# Patient Record
Sex: FEMALE | Race: WHITE | URBAN - METROPOLITAN AREA
[De-identification: names, ages, dates, MRNs, and addresses within clinical notes are randomized per-mention and may not be internally consistent; named-entity substitution may affect disease eponyms.]

---

## 2020-04-16 ENCOUNTER — HOSPITAL ENCOUNTER (OUTPATIENT)
Dept: MEDSURG UNIT | Facility: HOSPITAL | Age: 80
End: 2020-04-19
Attending: INTERNAL MEDICINE | Admitting: INTERNAL MEDICINE

## 2020-04-19 LAB — FINAL CULTURE: NO GROWTH

## 2022-04-09 ENCOUNTER — HISTORICAL (OUTPATIENT)
Dept: ADMINISTRATIVE | Facility: HOSPITAL | Age: 82
End: 2022-04-09

## 2022-04-26 VITALS
SYSTOLIC BLOOD PRESSURE: 180 MMHG | BODY MASS INDEX: 22.66 KG/M2 | WEIGHT: 136 LBS | DIASTOLIC BLOOD PRESSURE: 88 MMHG | HEIGHT: 65 IN

## 2022-04-30 NOTE — OP NOTE
PREOPERATIVE DIAGNOSES:    1. Gastrointestinal bleeding.  2. Epigastric pain and nausea.    POSTOPERATIVE DIAGNOSES:    1. Gastrointestinal bleeding.  2. Epigastric pain and nausea.    OPERATION:  Esophagogastroduodenoscopy with biopsy.    COMPLICATIONS:  None.    ANESTHESIA:  General endotracheal anesthetic.    FINDINGS:    1. Duodenum with normal papilla.  Wide mouth duodenal diverticulum without significant mucosal pathology between the 1st and 2nd portion of the duodenum.  2. Significant antritis with distal gastritis with some erythematous striping.  Biopsy of antrum taken with cold forceps.  3. Suspected healed ulceration, small, to the distal stomach/pyloric channel beyond the gastric incisura.  Biopsy taken with cold forceps.  4. Normal gastric body otherwise, with possible mild gastritis throughout.  5. Scope trauma noted proximally at the stomach.  6. Gastric fundus, normal appearing.  7. A 2 cm sliding hiatal hernia.  8. Z-line at 40 cm without significant pathology.  9. Normal esophagus.    OPERATIVE REPORT:  Patient was brought to the OR, placed in supine position.  Anesthesia was induced.  The airway was controlled with general endotracheal anesthetic.  Scope was advanced through the oropharynx into the esophagus and stomach, and eventually to the duodenum, up to the 3rd portion without issue.  The mucosa was inspected, bilious fluid was encountered.  There was no roxanna blood that could be visualized.  The duodenal major papilla was visualized without significant pathology.  The scope was retrieved slowly with the wall visualized under insufflation.  Between the 1st and 2nd portion of the duodenum, there was a wide mouth duodenal diverticulum that was entered.  The scope quickly popped out of the diverticulum, but the mucosa that was visualized throughout the diverticulum was normal appearing without significant pathology.  The scope was slowly withdrawn.  The antrum was encountered.   Erythematous change suggestive of inflammation was identified.  Biopsy was taken with cold forceps.  Some watermelon striping was noted to the distal stomach.  Some small suspected healed ulceration was identified in the distal stomach beyond the incisura, and this was biopsied with the cold forceps.  Otherwise, the body of the stomach was normal appearing with some possible mild gastritis.  Retroflexion demonstrated a normal cardia and a sliding hiatal hernia of approximately 2 cm in size.  The scope was retrieved.  It was retracted to approximately 40 cm from the incisors, where the Z-line was visualized.  Otherwise, the scope was slowly withdrawn.  The esophagus was normal appearing, without significant pathology.  The vocal cords were not visualized due to the patient's intubation state.  The patient tolerated the procedure well, without complication.        ZEINAB/BARRERA   DD: 04/17/2020 1400   DT: 04/17/2020 1417  Job # [id number]    cc: Elizabeth Carrillo M.D.

## 2022-04-30 NOTE — OP NOTE
PREOPERATIVE DIAGNOSES:    1. Bright red blood per rectum.   2. Hypothyroidism.   3. Anxiety.   4. Tobacco abuse.    POSTOPERATIVE DIAGNOSES:    1. Bright red blood per rectum, likely related to internal hemorrhoid disease.  2. Hypothyroidism.   3. Anxiety.   4. Tobacco abuse.    OPERATION:  Colonoscopy.    COMPLICATIONS:  None.    ANESTHESIA:  Sedation.    FINDINGS:    1. Digital rectal exam with prolapsed grade 3 internal hemorrhoids, irritated.  2. External hemorrhoids present.  3. No mass lesions palpable.   4. Somewhat floppy sigmoid.  5. Moderate, severe sigmoid diverticulosis.  6. Cecum intubated with identification of the appendiceal orifice and ileocecal valve, both normal-appearing.  7. Mild diverticulosis throughout the right colon, transverse colon and descending colon with transition to moderate to severe diverticulosis throughout the sigmoid colon.  8. Rectum normal appearing.  9. Small diminutive polyp just above the anal canal suspicious for a hyperplastic polyp, biopsy deferred to the time of anoscopy.    10. Prep was fair to poor.    SPECIMENS:  None.    DESCRIPTION OF PROCEDURE:  The patient was brought to the OR, placed in left lateral decubitus position.  Sedation was induced.  Digital rectal exam was performed with findings as noted above.  Otherwise, the scope was advanced through the anorectum into the sigmoid colon.     The prep was fair to poor with some substantial thick stool covering the walls.  This, plus a floppy sigmoid colon in the setting of severe diverticulosis, made advancement somewhat tricky, but ultimately the scope was advanced to the cecum.  The cecum was intubated and the appendiceal orifice was identified.  The ileocecal valve was identified.  Both were normal-appearing.  The scope was slowly withdrawn.  Mild diverticulosis was seen in the right colon and the transverse colon.  Otherwise, some mild diverticulosis noted in the descending colon, crescendoing to a  more moderate to severe diverticulosis toward the sigmoid colon.  No distinct mass lesion could be grossly identified.  Again, visualization was somewhat hindered by the stool that was present along the walls, but major lesions were ruled out.  The stool that was present within the colon was completely without blood grossly.  The scope was withdrawn into the rectum, which was normal appearing.  Retroflexion demonstrated irritated internal hemorrhoidal disease with some slight stigma of bleeding.  A small polyp was noted at the top of the anal canal and the lower rectum.  Biopsy of this was deferred for anticipated anoscopy in the future.  Otherwise, the scope was completely retrieved without issue.  The patient tolerated the procedure well.  There were no complications.        ZEINAB/BARRERA   DD: 04/19/2020 1401   DT: 04/19/2020 1424  Job # [id number]

## 2022-04-30 NOTE — CONSULTS
Patient:   Aye Rush             MRN: [id number]            FIN: [id number]               Age:   79 years     Sex:  Female     :  1940   Associated Diagnoses:   None   Author:   Elena Dowling MD      History of Present Illness   79F with new onset of bright blood per rectum in association with epigastric pain and nausea as well as lower abdominal cramping; h/o anxiety, htn, hypothyroidism, left humerus surgery for fracture (Dr. Willson), cholecystectomy in , tobacco use (chronic).  She says that yesterday, she had a normal bowel movement in the morning.  She then had a bowel movement mixed with blood with blood per rectum to follow - this was later in the day.  She had some epigastric pain and nausea shortly after, and she threw up everything she had for lunch.  She came to the hospital after that.  The bleeding stopped overnight, but this morning, she had a small amount again.  No f/c.  THis has never happened before.  She has had an EGD several years prior for uncertain reasons in [place] - this was without pathology, per patient.  She also had a colonoscopy per Dr. Jimenez in [place] ~ 3-4 years prior - a couple of polyps were noted, but no significant pathology (per patient).  H/o mother who passed from complications of diverticulitis.    She did used to smoke 1/2 ppd since 19 y/o - she stopped in the 1960s for pregnancy, but she started up after that until she had a humerus fracture needing repair recently (Dr. Willson).  She now smokes 2-3 cigarettes per week at work.   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    12 point ros performed with pertinent findings as noted above.    MedHx: anxiety, htn, hypothyroidism, left humerus surgery for fracture (Dr. Willson), cholecystectomy in , tobacco use (chronic)  SurgHx: left humerus fracture, egd, colonoscopy, cholecystectomy  FamHx: denies  All: As  listed  MedHx: aspirin, levothyroxine  SocHx: patient lives alone.  she has 4 children, the oldest of whom has passed away from complications of polymyositis.  .  she works, as above.  tobacco use as noted above.  no alcohol or illicit drug use.        Physical Examination      Vital Signs (last 24 hrs)_____  Last Charted___________  Temp Oral     37.2 DegC  (APR 17 12:44)  Heart Rate Peripheral   92 bpm  (APR 17 12:44)  Resp Rate         18 br/min  (APR 17 05:00)  SBP      H 152mmHg  (APR 17 12:44)  DBP      79 mmHg  (APR 17 12:44)  SpO2      L 90%  (APR 17 12:44)  Weight      62.1 kg  (APR 16 18:28)  Height      157 cm  (APR 16 18:28)  BMI      25.19  (APR 16 18:28)     a+ox3, nad  eomi  neck supple  no supraclavicular lymphadenopathy  chest rise equal B  heart rrr  abd soft, some ttp in epigastrium, otherwise nontender.  + mild distension.  no guarding or rebound.   ble no ttp no edema  skin no apparent rashes  psych calm cooperative  neuro no lateralizing defects appreciated grossly      Review / Management   Results review:  Lab results   4/17/2020 4:21 CDT       WBC                       6.1 x10(3)/mcL                             RBC                       3.50 x10(6)/mcL  LOW                             Hgb                       11.1 gm/dL  LOW                             Hct                       33.6 %  LOW                             Platelet                  199 x10(3)/mcL                             MCV                       96 fL                             MCH                       32 pg                             MCHC                      33 gm/dL                             RDW                       13.3 %                             MPV                       10.6 fL  HI                             Abs Neut                  4.0 x10(3)/mcL                             Neutro Auto               65 %                             Lymph Auto                20 %                             Mono Auto                  10 %                             Eos Auto                  4 %                             Abs Eos                   0.2 x10(3)/mcL                             Basophil Auto             0 %                             Abs Neutro                4.0 x10(3)/mcL                             Abs Lymph                 1.2 x10(3)/mcL                             Abs Mono                  0.6 x10(3)/mcL                             Abs Baso                  0.0 x10(3)/mcL                             IG%                       0.200 %                             IG#                       0.0100    4/16/2020 19:20 CDT      WBC                       6.7 x10(3)/mcL                             RBC                       3.88 x10(6)/mcL  LOW                             Hgb                       12.2 gm/dL                             Hct                       37.3 %                             Platelet                  209 x10(3)/mcL                             MCV                       96 fL                             MCH                       31 pg                             MCHC                      33 gm/dL                             RDW                       13.2 %                             MPV                       10.3 fL  HI                             Abs Neut                  5.2 x10(3)/mcL                             Neutro Auto               77 %  HI                             Lymph Auto                14 %  LOW                             Mono Auto                 7 %                             Eos Auto                  1 %                             Abs Eos                   0.1 x10(3)/mcL                             Basophil Auto             0 %                             Abs Neutro                5.2 x10(3)/mcL                             Abs Lymph                 1.0 x10(3)/mcL                             Abs Mono                  0.5 x10(3)/mcL                             Abs Baso                   0.0 x10(3)/mcL                             IG%                       0.300 %                             IG#                       0.0200  HI    4/16/2020 14:44 CDT      WBC                       10.1 x10(3)/mcL                             RBC                       4.38 x10(6)/mcL                             Hgb                       13.9 gm/dL                             Hct                       41.6 %                             Platelet                  221 x10(3)/mcL                             MCV                       95 fL                             MCH                       32 pg                             MCHC                      33 gm/dL                             RDW                       13.0 %                             MPV                       10.2 fL  HI                             Abs Neut                  7.7 x10(3)/mcL  HI                             Neutro Auto               77 %  HI                             Lymph Auto                13 %  LOW                             Mono Auto                 7 %                             Eos Auto                  2 %                             Abs Eos                   0.2 x10(3)/mcL                             Basophil Auto             0 %                             Abs Neutro                7.7 x10(3)/mcL  HI                             Abs Lymph                 1.3 x10(3)/mcL                             Abs Mono                  0.7 x10(3)/mcL                             Abs Baso                  0.0 x10(3)/mcL                             IG%                       0.500 %  HI                             IG#                       0.0500  HI                             Sodium Lvl                140 mmol/L                             Potassium Lvl             4.5 mmol/L                             Chloride                  104 mmol/L                             CO2                       25 mmol/L                             Calcium Lvl                9.6 mg/dL                             Glucose Lvl               115 mg/dL                             BUN                       16 mg/dL                             Creatinine                0.98 mg/dL                             eGFR-AA                   >60 mL/min/1.73 m2  NA                             eGFR-ISIAH                  58 mL/min/1.73 m2  NA                             Bili Total                0.2 mg/dL                             Bili Direct               0.10 mg/dL                             Bili Indirect             0.10 mg/dL  NA                             AST                       32 unit/L                             ALT                       32 unit/L                             Alk Phos                  122 unit/L  HI                             Total Protein             7.9 gm/dL                             Albumin Lvl               3.7 gm/dL                             Globulin                  4.20 gm/dL  NA                             A/G Ratio                 0.9 ratio  NA                             Occult Bld Stl            Positive  .      CT a/p images and report personally reviewed - I do question if there is blood pooled fluid in the sigmoid colon vs an area of blush.  + diverticulosis.  Fluid in sigmoid to the rectum appreciated.      IMPRESSION:  1. Scattered diverticula throughout the colon with a tortuous and  redundant fluid-filled sigmoid colon up to the rectum.  2. Nonvisualized gallbladder with prominent common bile duct and  pancreatic duct. This may represent postcholecystectomy changes. A  sonogram and/or ERCP exam would allow further evaluation if clinically  indicated.  3. Mild cardiomegaly  4. Findings suspicious for a cyst at the lower left kidney measuring  up to 2.0 cm. A sonogram would allow further evaluation.  5. Thoracolumbar spondylosis with the compression deformities and  vertebroplasty changes at T12 and L3 and anterior compression  deformity at  T11  6. Osteopenia  7. Extensive atherosclerosis  8. Atelectasis and or scarring lung bases  9. Small ill-defined fluid density at the fundal endometrium. A  sonogram would allow further evaluation.  10. Findings suspicious for a diverticulum at the duodenal sweep  measuring up to 2.6 cm in diameter         Impression and Plan   79F with bright blood per rectum in association with cramping and also upper abdominal pain and nausea, new onset.  CT findings with fluid in sigmoid and questionable blood noted at the sigmoid per my eye (as a source or as an area of pooling).  Scopes over 3-4 years prior without significant relevant pathology besides diverticular disease, per patient.  Some slight decline in hgb since admission.  Bleeding has lessened with time, per patient.  At this time, plan for:  1) EGD today  2) consider bleeding scan after EGD  3) colonoscopy to be planned either tomorrow or the next day pending results    All of this was discussed in detail with the patient, and she demonstrates understanding.  All questions answered.   I appreciate the opportunity to be involved in Mrs. Rush's care. Thanks for this consultation.

## 2022-04-30 NOTE — H&P
Patient:   Aye Rush             MRN: [id number]            FIN: [id number]               Age:   79 years     Sex:  Female     :  1940   Associated Diagnoses:   None   Author:   Martin Mchugh MD A      Chief Complaint   bright  red blood per rectum      History of Present Illness             The patient presents with Very pleasant 79-year-old  female with a past medical history significant for hypothyroidism and hypertension.  Her PCP is Dr. Loco.  She has been in relatively good health over the course of the last several months.  She states that this morning she woke up in her usual fashion she had lunch and somewhat later developed abdominal cramping which she thought was leading to diarrhea.  Patient went to the restroom she had a bowel movement and reached down and noticed bright red blood per rectum.  Patient has had a colonoscopy approximately 4 years ago.  She denies any weight loss.  She denies any NSAID use.  She denies any epigastric pain on use of aspirin routinely..        Review of Systems   Constitutional:  Negative.    Ear/Nose/Mouth/Throat:  Negative.    Respiratory:  Negative.    Cardiovascular:  Negative.    Gastrointestinal:       Abdominal pain: Characterized as ( Hematochezia ).    Genitourinary:  Negative.    Endocrine:  Negative.    Immunologic:  Negative.    Musculoskeletal:  Negative.    Integumentary:  Negative.       Health Status   Allergies:    Allergies (2) Active Reaction  codeine Vomiting  Demerol Vomiting     Current medications:    Medications (4) Active  Scheduled: (3)  DIAzepam 5 mg Tab UD  5 mg 1 tab(s), Oral, TID  levothyroxine 75 mcg (0.075 mg) Tab UD  75 mcg 1 tab(s), Oral, Daily  lisinopril 20 mg Tab UD ([facility])  20 mg 1 tab(s), Oral, Daily  Continuous: (1)  sodium chloride 0.9% 1,000 mL  1,000 mL, IV, 75 mL/hr  PRN: (0)     Problem list:    Active Problems (3)  COPD - Chronic obstructive pulmonary disease   HTN - Hypertension    Hypothyroid         Histories   Family History:    No family history items have been selected or recorded.   Social History        Social & Psychosocial Habits    Alcohol  11/05/2018  Use: Never    Home/Environment  11/05/2018  Lives with: Alone    Living situation: Home/Independent    Substance Use  11/05/2018  Use: Never    Tobacco  11/05/2018  Use: Former smoker    Patient Wants Consult For Cessation Counseling N/A    04/16/2020  Use: 4 or less cigarettes(less    Patient Wants Consult For Cessation Counseling N/A    Abuse/Neglect  04/16/2020  SHX Any signs of abuse or neglect No  .        Physical Examination   General:  Alert and oriented, No acute distress.    Eye:  Pupils are equal, round and reactive to light, Extraocular movements are intact.    HENT:  Normocephalic.    Neck:  Supple, Non-tender.    Respiratory:  Lungs are clear to auscultation, Respirations are non-labored, Breath sounds are equal.    Cardiovascular:  Normal rate, Regular rhythm.    Gastrointestinal:  Soft, Non-tender, Non-distended.       Vital Signs (last 24 hrs)_____  Last Charted___________  Temp Oral     37.2 DegC  (APR 16 14:26)  Heart Rate Peripheral   H 105bpm  (APR 16 16:45)  Resp Rate         16 br/min  (APR 16 16:45)  SBP      H 143mmHg  (APR 16 17:00)  DBP      H 97mmHg  (APR 16 17:00)  SpO2      97 %  (APR 16 16:45)  Weight      62.1 kg  (APR 16 18:28)  Height      157 cm  (APR 16 18:28)  BMI      25.19  (APR 16 18:28)     Genitourinary:  No costovertebral angle tenderness, No inguinal tenderness.    Lymphatics:  No lymphadenopathy neck, axilla, groin.    Musculoskeletal:  Normal range of motion, Normal strength.    Integumentary:  Warm, Pink.    Neurologic:  Alert, Oriented.    Cognition and Speech:  Oriented.    Psychiatric:  Cooperative, Appropriate mood & affect.       Review / Management   Results review:     Labs (Last four charted values)  WBC                  10.1 (APR 16)   Hgb                  13.9 (APR 16)   Hct                   41.6 (APR 16)   Plt                  221 (APR 16)   Na                   140 (APR 16)   K                    4.5 (APR 16)   CO2                  25 (APR 16)   Cl                   104 (APR 16)   Cr                   0.98 (APR 16)   BUN                  16 (APR 16)   Glucose Random       115 (APR 16)   PT                   H 12.1 (APR 16)   INR                  0.9 (APR 16)   PTT                  26.2 (APR 16) .    Condition:  Fair.       Impression and Plan   Diagnosis     1.  Hematochezia with a stable H&H.  No history of NSAID use.  Colonoscopy 4 years ago by Dr. Jimenez in [place]  -We will ask for repeat H&H patient to be placed on telemetry with CBC in the a.m.  -Consult surgery for sigmoidoscope versus colonoscopy  -We will keep n.p.o. throughout the night  2.  Hypertension resume home meds once noted appropriate  3.  Hypothyroidism resume thyroid supplement once med rec's have been complete    SCDs for DVT prophylaxis.

## 2022-04-30 NOTE — DISCHARGE SUMMARY
Patient:   Aye Rush             MRN: [id number]            FIN: [id number]               Age:   79 years     Sex:  Female     :  1940   Associated Diagnoses:   Colon, diverticulosis; Hypertension; Acute lower gastrointestinal bleeding; Rectal bleed   Author:   Martin Mchugh MD      Discharge Information      Discharge Summary Information   Admitted  2020   Discharged  2020   Admitting physician     Martin Mchugh MD.     Discharge diagnosis     Colon, diverticulosis (GPX48-FW K57.30).     Hypertension (XYI82-QR I10).     Acute lower gastrointestinal bleeding (LXV83-QF K92.2).     Rectal bleed (PNED [id number]).        Physical Examination      Vital Signs (last 24 hrs)_____  Last Charted___________  Temp Oral     36.9 DegC  ( 14:00)  Heart Rate Peripheral   68 bpm  (:)  SBP      H 141mmHg  (:)  DBP      74 mmHg  (:)  SpO2      96 %  (:)         General:  Alert and oriented, No acute distress.    Eye:  Pupils are equal, round and reactive to light, Extraocular movements are intact.    HENT:  Normocephalic.    Neck:  Supple, Non-tender.    Respiratory:  Lungs are clear to auscultation, Respirations are non-labored, Breath sounds are equal.    Cardiovascular:  Normal rate, Regular rhythm.    Gastrointestinal:  Soft, Non-tender, Non-distended.   Genitourinary:  No costovertebral angle tenderness, No inguinal tenderness.    Lymphatics:  No lymphadenopathy neck, axilla, groin.    Musculoskeletal:  Normal range of motion, Normal strength.    Integumentary:  Warm, Pink.    Neurologic:  Alert, Oriented.    Cognition and Speech:  Oriented.    Psychiatric:  Cooperative, Appropriate mood & affect.             Hospital Course   Hospital Course   Admitting diagnosis: Colon, diverticulosis (WNI80-UH K57.30), Hypertension (NCW48-OS I10), Acute lower gastrointestinal bleeding (QEJ65-PL K92.2), Rectal bleed (PNED  [id number]).     Admission disposition:   Admit presentation: The patient presents with Very pleasant 79-year-old  female with a past medical history significant for hypothyroidism and hypertension.  Her PCP is Dr. Loco.  She has been in relatively good health over the course of the last several months.  She states that this morning she woke up in her usual fashion she had lunch and somewhat later developed abdominal cramping which she thought was leading to diarrhea.  Patient went to the restroom she had a bowel movement and reached down and noticed bright red blood per rectum.  Patient has had a colonoscopy approximately 4 years ago.  She denies any weight loss.  She denies any NSAID use.  She denies any epigastric pain on use of aspirin routinely..     1.  Hematochezia with a stable H&H.  To have internal hemorrhoids on colonoscopy discussed care with Dr. Dowling patient is stable for discharge with follow-up  -Care to date: Slight decrease in hemoglobin him patient to get EGD today appreciate surgical consultation-my understanding old nonactive gastric ulcers  -patient have colonoscopy in the morning  Care to date: No history of NSAID use.  Colonoscopy 4 years ago by Dr. Jimenez in [place]  -We will ask for repeat H&H patient to be placed on telemetry with CBC in the a.m.  -Consult surgery for sigmoidoscope versus colonoscopy  -We will keep n.p.o. throughout the night  2.  Hypertension resume home meds once noted appropriate  3.  Hypothyroidism resume thyroid supplement once med rec's have been complete.   .        Discharge Plan   Education and Follow-up   Discharge Planning: Stable for discharge to home 35-minute discharge..

## 2022-04-30 NOTE — ED PROVIDER NOTES
Patient:   Aye Rush             MRN: [id number]            FIN: [id number]               Age:   79 years     Sex:  Female     :  1940   Associated Diagnoses:   Acute lower gastrointestinal bleeding; Colon, diverticulosis   Author:   Raad Elliott MD      Basic Information   Time seen: Date & time 2020 14:35:00.   History source: Patient.   Arrival mode: Private vehicle, walking.   History limitation: None.   Additional information: Patient's physician(s): Elizabeth Carrillo MD, Chief Complaint from Nursing Triage Note : Chief Complaint   2020 14:26 CDT      Chief Complaint           c/o bright red rectal bleeding x 4 today.  .      History of Present Illness   The patient presents with rectal bleeding.  The onset was 3  hours ago.  Vomiting: x 1 after eating at 12 noon.  Rectal bleed: grossly bloody and degree moderate.  The exacerbating factor is none.  The relieving factor is none.  Risk factors consist of not anticoagulated and not nonsteroidal anti-inflammatory drugs.  Prior episodes: none.  Therapy today: none.  Associated symptoms: abdominal pain, vomiting and denies rectal pain.  Pt. reports that she had normal BM this am ate at 11 am today then at 11:30 had  large bright red stool then had 2 more. Also reports vomiting x 1 at noon after eating. Has mild suprapubic pain. .        Review of Systems   Constitutional symptoms:  Negative except as documented in HPI, no fever, no chills.    Skin symptoms:  Negative except as documented in HPI.   Eye symptoms:  Negative except as documented in HPI.   ENMT symptoms:  Negative except as documented in HPI.   Respiratory symptoms:  Negative except as documented in HPI.   Cardiovascular symptoms:  Negative except as documented in HPI.   Gastrointestinal symptoms:  Abdominal pain, mild, suprapubic, vomiting, rectal bleeding.    Genitourinary symptoms:  Negative except as documented in HPI.   Musculoskeletal symptoms:  Negative except as  documented in HPI.   Neurologic symptoms:  Negative except as documented in HPI.   Psychiatric symptoms:  Negative except as documented in HPI.   Endocrine symptoms:  Negative except as documented in HPI.   Hematologic/Lymphatic symptoms:  Negative except as documented in HPI.   Allergy/immunologic symptoms:  Negative except as documented in HPI.             Additional review of systems information: All systems reviewed as documented in chart.      Health Status   Allergies:    Allergic Reactions (Selected)  Severity Not Documented  Codeine- Vomiting.  Demerol- Vomiting.,    Allergies (2) Active Reaction  codeine Vomiting  Demerol Vomiting  .   Medications:  (Selected)   Documented Medications  Documented  Aspir-Low 81 mg oral delayed release tablet: 81 mg = 1 tab(s), Oral, qPM, # 30 tab(s), 0 Refill(s)  LEVOTHYROXIN TAB 75MC mcg = 1 tab(s), Oral, Daily  SYMBICORT    AER 80-4.5: 2 puff(s), BID.      Past Medical/ Family/ Social History   Medical history:    Active  Hypothyroid (283051190)  HTN - Hypertension (3321026751)  COPD - Chronic obstructive pulmonary disease (120199269).   Surgical history:    ORIF Humerus (Left) on 2018 at 78 Years.  Comments:  2018 14:44 CST - Mary Jo Mckeon  auto-populated from documented surgical case  Tonsillectomy ([id number]).  Appendectomy (568052558).  Cholecystectomy (60054387)..   Family history: Not significant.   Social history:    Social & Psychosocial Habits    Alcohol  2018  Use: Never    Home/Environment  2018  Lives with: Alone    Living situation: Home/Independent    Substance Use  2018  Use: Never    Tobacco  2018  Use: Former smoker    Patient Wants Consult For Cessation Counseling N/A    2020  Use: 4 or less cigarettes(less    Patient Wants Consult For Cessation Counseling N/A    Abuse/Neglect  2020  SHX Any signs of abuse or neglect No  , Alcohol use: Denies, Tobacco use: Regularly, Drug use: Denies, Occupation:  Employed, Family/social situation: Unmarried.   Problem list:    Active Problems (2)  COPD - Chronic obstructive pulmonary disease   Hypothyroid   .      Physical Examination               Vital Signs   Vital Signs   4/16/2020 14:26 CDT      Temperature Oral          37.2 DegC                             Temperature Oral (calculated)             98.96 DegF                             Peripheral Pulse Rate     120 bpm  HI                             Respiratory Rate          18 br/min                             SpO2                      95 %                             Oxygen Therapy            Room air                             Systolic Blood Pressure   164 mmHg  HI                             Diastolic Blood Pressure  105 mmHg  HI  .      Vital Signs (last 24 hrs)_____  Last Charted___________  Temp Oral     37.2 DegC  (APR 16 14:26)  Heart Rate Peripheral   H 120bpm  (APR 16 14:26)  Resp Rate         18 br/min  (APR 16 14:26)  SBP      H 164mmHg  (APR 16 14:26)  DBP      H 105mmHg  (APR 16 14:26)  SpO2      95 %  (APR 16 14:26)  .   Measurements   4/16/2020 14:26 CDT      Weight Dosing             62.1 kg                             Weight Measured and Calculated in Lbs     136.91 lb                             Weight Estimated          62.1 kg  .   Basic Oxygen Information   4/16/2020 14:26 CDT      SpO2                      95 %                             Oxygen Therapy            Room air  .   General:  Alert, no acute distress.    Skin:  Warm, dry, intact, normal for ethnicity.    Head:  Normocephalic, atraumatic.    Neck:  Supple, no tenderness.    Eye:  Pupils are equal, round and reactive to light, extraocular movements are intact.    Ears, nose, mouth and throat:  Oral mucosa moist.   Cardiovascular:  Regular rate and rhythm, No murmur, Normal peripheral perfusion, No edema.    Respiratory:  Lungs are clear to auscultation, respirations are non-labored, breath sounds are equal, Symmetrical chest wall  expansion.    Chest wall:  No tenderness, No deformity.    Back:  Nontender, Normal range of motion, Normal alignment.    Musculoskeletal:  Normal ROM, normal strength, no tenderness, no swelling, no deformity.    Gastrointestinal:  Soft, Non distended, Normal bowel sounds, Tenderness: Mild, suprapubic, Rectal exam: Normal tone, stool color red, guaiac positive.    Neurological:  Alert and oriented to person, place, time, and situation, No focal neurological deficit observed, normal sensory observed, normal motor observed, normal speech observed, normal coordination observed.    Lymphatics:  No lymphadenopathy.   Psychiatric:  Cooperative, appropriate mood & affect, normal judgment.       Medical Decision Making   Documents reviewed:  Emergency department nurses' notes.   Orders  Launch Orders   Laboratory:  Occult Blood Stool #1 Screening (Order): Stat collect, 4/16/2020 14:43 CDT, Stool, Nurse collect, 4/16/2020 14:43 CDT  Urinalysis with Microscopic if Indicated (Order): Stat collect, Urine, 4/16/2020 14:43 CDT, Nurse collect, Print Label By Order Location  CMP (Order): Stat collect, 4/16/2020 14:43 CDT, Blood, Lab Collect, 4/16/2020 14:43 CDT  CBC w/ Auto Diff (Order): Now collect, 4/16/2020 14:43 CDT, Blood, Lab Collect, 4/16/2020 14:43 CDT  Patient Care:  Orthostatic Vital Signs (Order): 4/16/2020 14:44 CDT  Saline Lock Insert (Order): 4/16/2020 14:43 CDT.   Results review:     No qualifying data available.   Radiology results:  Rad Results (ST)  < 12 hrs   Accession: [id number]  Order: CT Abdomen and Pelvis W Contrast  Report Dt/Tm: 04/16/2020 16:01  Report:   CT ABDOMEN AND PELVIS WITH CONTRAST:     HISTORY: GI Bleed  rectal bleeding     PATIENT RADIATION DOSE:  CTDI vol(mGy) WITH: 3 0.80                                                  DLP(mGycm) WITH:  1475.80      As per PQRS measures, all CT scans at this facility used dose  modulation, iterative reconstruction, and/or weight based dose  adjustment  when appropriate to reduce radiation dose to as low as  reasonably achievable.     COMPARISON:None available     FINDINGS: Serial axial images were obtained through the abdomen and  pelvis with the administration of  IV contrast. Coronal and sagittal  reconstructions where also obtained. Degenerative changes are evident  at the thoracolumbar spine. Compression deformities and vertebroplasty  changes are evident at T12 and L3. There is very slight  anterolisthesis of L4 on L5. Bony structures are osteopenic. There is  anterior wedge compression deformity evident at T11. Atherosclerosis  is seen within the aorta and branching vessels. The heart is mildly  enlarged. Atelectasis and or scarring is evident at the lung bases.  The liver, spleen, and adrenal glands are grossly within normal  limits. The gallbladder is not identified. The common bile duct is  prominent in size measuring 10 mm in diameter with tapering distally.  The pancreatic duct is enlarged measuring up to 3 mm at the pancreatic  head. The kidneys are relatively symmetric in size. No hydronephrosis  is seen. A round low-attenuation focus is evident at the lower left  kidney measuring 2.0 cm in diameter suspicious for a cyst. Scattered  small lymph nodes are seen within the periaortic and pericaval region.  There is mild mucosal prominence versus underdistention at the gastric  fundus. No dilated loops of bowel are identified. No significant free  fluid collection is seen. Scattered diverticula are evident throughout  the colon. The sigmoid colon is tortuous and redundant. The uterus is  grossly normal in size. A faint small fluid density is evident at the  fundal endometrium. The bladder is partially distended with contrast  on delayed postcontrast imaging. There is a suspect diverticulum at  the duodenal sweep partially distended with fluid and gas measuring up  to 2.6 cm in diameter. Gas and feces are scattered throughout the  colon. There is fluid in  the sigmoid colon up to the rectum. The  appendix is not identified with certainty. Calcified injection  granulomas are noted to the subcutaneous tissues of the buttocks  bilaterally.     IMPRESSION:  1. Scattered diverticula throughout the colon with a tortuous and  redundant fluid-filled sigmoid colon up to the rectum.  2. Nonvisualized gallbladder with prominent common bile duct and  pancreatic duct. This may represent postcholecystectomy changes. A  sonogram and/or ERCP exam would allow further evaluation if clinically  indicated.  3. Mild cardiomegaly  4. Findings suspicious for a cyst at the lower left kidney measuring  up to 2.0 cm. A sonogram would allow further evaluation.  5. Thoracolumbar spondylosis with the compression deformities and  vertebroplasty changes at T12 and L3 and anterior compression  deformity at T11  6. Osteopenia  7. Extensive atherosclerosis  8. Atelectasis and or scarring lung bases  9. Small ill-defined fluid density at the fundal endometrium. A  sonogram would allow further evaluation.  10. Findings suspicious for a diverticulum at the duodenal sweep  measuring up to 2.6 cm in diameter         .      Reexamination/ Reevaluation   Vital signs   Basic Oxygen Information   4/16/2020 14:26 CDT      SpO2                      95 %                             Oxygen Therapy            Room air        Impression and Plan   Diagnosis   Acute lower gastrointestinal bleeding (DDX73-YD K92.2)   Colon, diverticulosis (UHK10-WT K57.30)      Calls-Consults   -  4/16/2020 17:26:00 , I spoke with Dr. Mchugh, who agreed for outpatient observation, colonoscopy tomorrow..    Plan   Disposition: Admit time  4/16/2020 17:26:00, Place in Observation Unit, Patient care transitioned to: Time: 4/16/2020 16:20:00, Lexi JIMENEZ, Raad DAWKINS    Counseled: Patient.    Orders: Launch Orders   Admit/Transfer/Discharge:  Place in Outpatient Observation (Order): 4/16/2020 17:27 CDT, Medical Unit Jeison JIMENEZ, Martin BARCENAS, No.